# Patient Record
Sex: MALE | Race: WHITE | NOT HISPANIC OR LATINO | URBAN - METROPOLITAN AREA
[De-identification: names, ages, dates, MRNs, and addresses within clinical notes are randomized per-mention and may not be internally consistent; named-entity substitution may affect disease eponyms.]

---

## 2017-09-26 ENCOUNTER — EMERGENCY (EMERGENCY)
Facility: HOSPITAL | Age: 37
LOS: 1 days | Discharge: HOME | End: 2017-09-26
Admitting: EMERGENCY MEDICINE
Payer: COMMERCIAL

## 2017-09-26 VITALS
OXYGEN SATURATION: 97 % | HEART RATE: 97 BPM | DIASTOLIC BLOOD PRESSURE: 71 MMHG | WEIGHT: 179.9 LBS | SYSTOLIC BLOOD PRESSURE: 128 MMHG | HEIGHT: 72 IN | TEMPERATURE: 99 F | RESPIRATION RATE: 19 BRPM

## 2017-09-26 PROCEDURE — 99283 EMERGENCY DEPT VISIT LOW MDM: CPT | Mod: 25

## 2017-09-26 PROCEDURE — 12001 RPR S/N/AX/GEN/TRNK 2.5CM/<: CPT

## 2017-09-26 RX ORDER — BACITRACIN ZINC 500 UNIT/G
1 OINTMENT IN PACKET (EA) TOPICAL ONCE
Qty: 0 | Refills: 0 | Status: COMPLETED | OUTPATIENT
Start: 2017-09-26 | End: 2017-09-26

## 2017-09-26 RX ADMIN — Medication 1 APPLICATION(S): at 02:28

## 2017-09-26 NOTE — ED PROVIDER NOTE - OBJECTIVE STATEMENT
36 y/o M presents to ED with laceration to R elbow.  He states he did not notice when he actually got the laceration but was noted to have bleeding after playing a hockey game.  He denies pain.  Tetanus is up to date.

## 2017-09-26 NOTE — ED ADULT NURSE NOTE - OBJECTIVE STATEMENT
Pt presents to ED with c/o laceration to right elbow, ROM intact, SILT, NVI, no deformities. Patient was playing hockey and struck his elbow against the boards of the hockey rink. Bleeding controlled

## 2017-09-26 NOTE — ED ADULT TRIAGE NOTE - CHIEF COMPLAINT QUOTE
Pt presents to ED with c/o laceration to right elbow- bleeding controlled. Patient struck his arm against the boards at a hockey game. No other injuries noted.

## 2017-09-26 NOTE — ED PROVIDER NOTE - SKIN WOUND TYPE
2 cm laceration over olecranon area of R elbow exposing subcutaneous tissue, no foreign body/LACERATION(S)

## 2017-09-26 NOTE — ED PROVIDER NOTE - MEDICAL DECISION MAKING DETAILS
38 y/o M presents to ED with laceration to R elbow.  Laceration cleansed, irrigated copiously, repaired, antibiotic ointment, sterile dressing applied.  Return precautions discussed.

## 2017-09-30 DIAGNOSIS — S51.011A LACERATION WITHOUT FOREIGN BODY OF RIGHT ELBOW, INITIAL ENCOUNTER: ICD-10-CM

## 2017-09-30 DIAGNOSIS — Z88.8 ALLERGY STATUS TO OTHER DRUGS, MEDICAMENTS AND BIOLOGICAL SUBSTANCES STATUS: ICD-10-CM

## 2018-03-20 ENCOUNTER — EMERGENCY (EMERGENCY)
Facility: HOSPITAL | Age: 38
LOS: 1 days | Discharge: HOME | End: 2018-03-20
Attending: EMERGENCY MEDICINE | Admitting: EMERGENCY MEDICINE
Payer: COMMERCIAL

## 2018-03-20 VITALS
RESPIRATION RATE: 20 BRPM | HEART RATE: 80 BPM | OXYGEN SATURATION: 98 % | DIASTOLIC BLOOD PRESSURE: 70 MMHG | TEMPERATURE: 98 F | SYSTOLIC BLOOD PRESSURE: 130 MMHG

## 2018-03-20 DIAGNOSIS — S61.202A UNSPECIFIED OPEN WOUND OF RIGHT MIDDLE FINGER WITHOUT DAMAGE TO NAIL, INITIAL ENCOUNTER: ICD-10-CM
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
## 2018-03-20 DIAGNOSIS — Z88.1 ALLERGY STATUS TO OTHER ANTIBIOTIC AGENTS STATUS: ICD-10-CM
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
## 2018-03-20 PROCEDURE — 12041 INTMD RPR N-HF/GENIT 2.5CM/<: CPT

## 2018-03-20 PROCEDURE — 99283 EMERGENCY DEPT VISIT LOW MDM: CPT | Mod: 25

## 2018-03-20 NOTE — ED ADULT TRIAGE NOTE - CHIEF COMPLAINT QUOTE
walkin patient with right hand 3rd digit injury. States was playing hockey and puck hit him head on at the fingertip. Possible avulsion. No active bleeding.

## 2018-03-21 RX ORDER — CEPHALEXIN 500 MG
500 CAPSULE ORAL ONCE
Qty: 0 | Refills: 0 | Status: COMPLETED | OUTPATIENT
Start: 2018-03-21 | End: 2018-03-21

## 2018-03-21 RX ORDER — OXYCODONE AND ACETAMINOPHEN 5; 325 MG/1; MG/1
2 TABLET ORAL ONCE
Qty: 0 | Refills: 0 | Status: DISCONTINUED | OUTPATIENT
Start: 2018-03-21 | End: 2018-03-21

## 2018-03-21 RX ADMIN — Medication 500 MILLIGRAM(S): at 00:50

## 2018-03-21 RX ADMIN — OXYCODONE AND ACETAMINOPHEN 2 TABLET(S): 5; 325 TABLET ORAL at 00:50

## 2018-03-21 NOTE — ED PROCEDURE NOTE - CPROC ED LACER REPAIR DETAIL1
No foreign body/Non-extensive debridement was performed./Multiple flaps were aligned./Nail was excised./Undermining was performed.

## 2018-03-21 NOTE — ED PROCEDURE NOTE - PROCEDURE ADDITIONAL DETAILS
fingertip avulsion, with patch of skin avulsed off, high risk for infection/failure, but blood supply at this time appears to be intact.  had a detailed discussion and pt will f/u with hand surgery

## 2018-03-22 RX ORDER — CEPHALEXIN 500 MG
1 CAPSULE ORAL
Qty: 40 | Refills: 0 | OUTPATIENT
Start: 2018-03-22 | End: 2018-03-31

## 2019-01-11 NOTE — ED PROVIDER NOTE - OBJECTIVE STATEMENT
37 y.o. male with fingertip avulsion 37 y.o. male with fingertip avulsion, from blunt trauma while playing hockey, some bleeding and partial avulsion of fingertip, tetanus UTD. no indicators present
